# Patient Record
Sex: MALE | Race: OTHER | HISPANIC OR LATINO | URBAN - METROPOLITAN AREA
[De-identification: names, ages, dates, MRNs, and addresses within clinical notes are randomized per-mention and may not be internally consistent; named-entity substitution may affect disease eponyms.]

---

## 2022-10-04 ENCOUNTER — EMERGENCY (EMERGENCY)
Facility: HOSPITAL | Age: 52
LOS: 1 days | Discharge: HOME | End: 2022-10-04
Admitting: STUDENT IN AN ORGANIZED HEALTH CARE EDUCATION/TRAINING PROGRAM
Payer: SELF-PAY

## 2022-10-04 VITALS
DIASTOLIC BLOOD PRESSURE: 86 MMHG | HEART RATE: 77 BPM | OXYGEN SATURATION: 97 % | RESPIRATION RATE: 16 BRPM | TEMPERATURE: 98 F | SYSTOLIC BLOOD PRESSURE: 137 MMHG
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
## 2022-10-04 DIAGNOSIS — Z23 ENCOUNTER FOR IMMUNIZATION: ICD-10-CM

## 2022-10-04 DIAGNOSIS — S51.812A LACERATION WITHOUT FOREIGN BODY OF LEFT FOREARM, INITIAL ENCOUNTER: ICD-10-CM
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
## 2022-10-04 PROCEDURE — 90715 TDAP VACCINE 7 YRS/> IM: CPT

## 2022-10-04 PROCEDURE — 99283 EMERGENCY DEPT VISIT LOW MDM: CPT | Mod: 25

## 2022-10-04 PROCEDURE — 12001 RPR S/N/AX/GEN/TRNK 2.5CM/<: CPT

## 2022-10-04 PROCEDURE — 90471 IMMUNIZATION ADMIN: CPT

## 2022-10-04 RX ORDER — TETANUS TOXOID, REDUCED DIPHTHERIA TOXOID AND ACELLULAR PERTUSSIS VACCINE, ADSORBED 5; 2.5; 8; 8; 2.5 [IU]/.5ML; [IU]/.5ML; UG/.5ML; UG/.5ML; UG/.5ML
0.5 SUSPENSION INTRAMUSCULAR ONCE
Refills: 0 | Status: COMPLETED | OUTPATIENT
Start: 2022-10-04 | End: 2022-10-04

## 2022-10-04 RX ADMIN — TETANUS TOXOID, REDUCED DIPHTHERIA TOXOID AND ACELLULAR PERTUSSIS VACCINE, ADSORBED 0.5 MILLILITER(S): 5; 2.5; 8; 8; 2.5 SUSPENSION INTRAMUSCULAR at 12:15

## 2022-10-04 NOTE — ED ADULT NURSE NOTE - OBJECTIVE STATEMENT
Pt endorses cutting arm on , laceration noted to left forearm, bleeding controlled. medicated with tdap, PA at bedside suturing. No RN intervention needed.

## 2022-10-04 NOTE — ED ADULT TRIAGE NOTE - CHIEF COMPLAINT QUOTE
"I was cutting cardboard and I cut my arm." Lac present to left forearm, no bleeding at this time. Unknown last tetanus.

## 2022-10-04 NOTE — ED PROVIDER NOTE - PATIENT PORTAL LINK FT
You can access the FollowMyHealth Patient Portal offered by [facility] by registering at the following website: [email or web address]. By joining [employer]’s FollowMyHealth portal, you will also be able to view your health information using other applications (apps) compatible with our system.

## 2022-10-04 NOTE — ED ADULT NURSE NOTE - NSIMPLEMENTINTERV_GEN_ALL_ED
Implemented All Universal Safety Interventions:  [place] to call system. Call bell, personal items and telephone within reach. Instruct patient to call for assistance. Room bathroom lighting operational. Non-slip footwear when patient is off stretcher. Physically safe environment: no spills, clutter or unnecessary equipment. Stretcher in lowest position, wheels locked, appropriate side rails in place.

## 2022-10-04 NOTE — ED PROVIDER NOTE - SKIN, MLM
2 cm laceration to volar left forearm, +FROM wrist, radial pulse 2+, strength 5/5, sensation intact distally

## 2022-10-04 NOTE — ED PROVIDER NOTE - CLINICAL SUMMARY MEDICAL DECISION MAKING FREE TEXT BOX
52 y/o m presents c/o left forearm lac 2/2 .  Ext NVI, tetanus updated in ED, lac repaired.  Will d/c with wound care instructions, f/u in 10 days for suture removal

## 2022-10-04 NOTE — ED PROVIDER NOTE - OBJECTIVE STATEMENT
52 y/o m presents with lac to left forearm.  Pt was using a  and accidentally cut his forearm when he pulled the blade through too hard.  Pt unsure of last tetanus.  Denies numbness/tingling to ext, all other ROS negative.

## 2022-10-04 NOTE — ED PROVIDER NOTE - NSFOLLOWUPINSTRUCTIONS_ED_ALL_ED_FT
Please return to ED for suture removal in 10 days on 10/14/22    Cuidado de un desgarro, en adultos    Laceration Care, Adult      Un desgarro es un aston que puede atravesar todas las capas de la piel hasta el tejido que se encuentra debajo de la piel. Algunos desgarros cicatrizan por sí solos. Otros se deben cerrar con puntos (suturas), grapas, tiras adhesivas para la piel o goma para cerrar la piel.    El cuidado adecuado de un desgarro reduce el riesgo de infección, ayuda a que el desgarro cierre mejor, y puede prevenir la formación de cicatrices.      Consejos generales    •Mantenga la herida limpia y seca.      • No se rasque ni se toque la herida.      •Lávese las cesario con agua y jabón nguyen al menos 20 segundos antes y después de tocar la herida o cambiarse la venda (vendaje). Use desinfectante para cesario si no dispone de agua y jabón.      • No usedesinfectantes ni antisépticos, jarad alcohol para frotar, para limpiar la herida, a menos que se lo indique mckinney médico.      •Si le colocaron un vendaje, cámbielo al menos abhinav vez al día o jarad se lo haya indicado el médico. También debe cambiarlo si se moja o se ensucia.        Cómo cuidar del desgarro    Si se utilizaron suturas o grapas:     •Mantenga la herida completamente seca nguyen las primeras 24 horas o jarad se lo haya indicado el médico. Transcurrido barb tiempo, puede ducharse o rupali em de inmersión. No se sumerja en agua hasta que le hayan quitado las suturas o grapas.    •Limpie la herida abhinav vez por día o jarad se lo haya indicado el médico. Para hacer esto:  •Lave la herida con agua y jabón.      •Enjuáguela con agua para quitar todo el jabón.      •Séquela dando palmaditas con abhinav toalla limpia. No frote la herida.        •Después de limpiar la herida, aplique abhinav delgada capa de ungüento con antibiótico, otros ungüentos tópicos, o un vendaje no adherente jarad se lo haya indicado el médico. [place] ayudará a prevenir infecciones y a evitar que el vendaje se adhiera a la herida.      •Quentin que le retiren las suturas o las grapas jarad se lo haya indicado el médico. No retire las suturas ni las grapas usted mismo.      Si se utilizaron tiras adhesivas para la piel:     • No deje que las tiras adhesivas para la piel se mojen. Puede bañarse o ducharse, mandy tenga cuidado de mantener la herida seca.      •Si se moja, séquela dando palmaditas con abhinav toalla limpia. No frote la herida.      •Las tiras adhesivas para la piel se caen solas. Si los bordes de las tiras adhesivas empiezan a despegarse y enroscarse, puede recortar los que estén sueltos. No retire las tiras adhesivas por completo a menos que el médico se lo indique.      Si se utilizó goma para cerrar la piel:     •Puede bañarse o ducharse, mandy tenga cuidado de mantener la herida seca. No sumerja la herida en agua.      •Después de ducharse o darse un baño, seque el aston dando palmaditas con abhinav toalla limpia. No frote la herida.      • No practique actividades que lo vero transpirar mucho hasta que la goma para cerrar la piel se haya caído tomas.      • No aplique líquidos, cremas ni ungüentos medicinales en la herida mientras todavía tenga la goma para cerrar la piel. De lo contrario, puede aflojar la película antes de que la herida cicatrice.      •Si la herida está cubierta con un vendaje, no aplique cinta adhesiva directamente sobre la goma para cerrar la piel. De lo contrario, puede hacer que la goma se despegue antes de que la herida cicatrice.      • No toque la goma. La goma para cerrar la piel generalmente permanece sobre la piel de 5 a 10 días y luego se  tomas.        Siga estas instrucciones en mckinney casa:    Medicamentos     •Use los medicamentos de venta hans y los recetados solamente jarad se lo haya indicado el médico.      •Si le recetaron un medicamento o ungüento con antibiótico, tómelo o aplíqueselo jarad se lo haya indicado el médico. No deje de usarlo aunque la afección mejore.      Control del dolor y la hinchazón   •Si se lo indican, aplique hielo sobre la beverly de la lesión. Para hacer esto:  •Ponga el hielo en abhinav bolsa plástica.      •Coloque abhinav toalla entre la piel y la bolsa.      •Aplique el hielo nguyen 20 minutos, 2 o 3 veces por día.      •Retire el hielo si la piel se pone de color ashley brillante. [place] es muy importante. Si no puede sentir dolor, calor o frío, tiene un mayor riesgo de que se dañe la beverly.        •Nguyen las primeras 24 a 48 horas después de que le hayan reparado el desgarro, cuando esté sentado o acostado, levante (eleve) la beverly de la lesión por encima del nivel del corazón.        Instrucciones generales   Two wounds closed with skin glue. One is normal. The other is red with pus and infected.   •Evite cualquier actividad que pueda hacer que el desgarro vuelva a abrirse.    •Controle la herida todos los días para detectar signos de infección. Esté atento a lo siguiente:  •Aumento del enrojecimiento, la hinchazón o el dolor.      •Líquido o saima.      •Calor.      •Pus o mal olor.        •Concurra a todas las visitas de seguimiento. [place] es importante.        Comuníquese con un médico si:    •Le aplicaron la vacuna antitetánica y tiene hinchazón, dolor intenso, enrojecimiento o hemorragia en el sitio de la inyección.      •La herida cerrada se abre.    •Tiene cualquiera de estos signos de infección:  •Más enrojecimiento, hinchazón o dolor alrededor de la herida.      •Líquido o saima que salen de la herida.      •Calor que proviene de la herida.      •Pus o mal olor proveniente de la herida.      •Fiebre.        •Nota un cuerpo extraño en la herida, jarad un trozo de hickman o erica.      •El dolor no se juan alberto con los medicamentos.      •Observa que la piel cerca de la herida cambia de color.      •Necesita cambiar el vendaje con frecuencia.      •Presenta abhinav nueva erupción cutánea.      •Tiene entumecimiento alrededor de la herida.        Solicite ayuda de inmediato si:    •Tiene mucha hinchazón alrededor de la herida.      •El dolor aumenta repentinamente y es intenso.      •Presenta nódulos dolorosos cerca de la herida o en la piel en cualquier beverly del cuerpo.      •Tiene abhinav línea cuong que sale de la herida.      •La herida está en la mano o en el pie, y no puede  correctamente xavi de los dedos.      •La herida está en la mano o en el pie y observa que los dedos tienen un adonay pálido o azulado.        Resumen    •Un desgarro es un aston que puede atravesar todas las capas de la piel hasta el tejido que se encuentra debajo de la piel.      •Algunos desgarros cicatrizan por sí solos. Otros se deben cerrar con puntos (suturas), grapas, tiras adhesivas para la piel o goma para cerrar la piel.      •El cuidado adecuado de un desgarro reduce el riesgo de infección, ayuda a que el desgarro cierre mejor, y puede prevenir la formación de cicatrices.      Esta información no tiene jarad fin reemplazar el consejo del médico. Asegúrese de hacerle al médico cualquier pregunta que tenga.

## 2022-10-20 ENCOUNTER — EMERGENCY (EMERGENCY)
Facility: HOSPITAL | Age: 52
LOS: 1 days | Discharge: HOME | End: 2022-10-20
Admitting: EMERGENCY MEDICINE
Payer: SELF-PAY

## 2022-10-20 VITALS
TEMPERATURE: 98 F | DIASTOLIC BLOOD PRESSURE: 75 MMHG | HEART RATE: 90 BPM | OXYGEN SATURATION: 95 % | RESPIRATION RATE: 16 BRPM | SYSTOLIC BLOOD PRESSURE: 120 MMHG
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
## 2022-10-20 DIAGNOSIS — S51.812D LACERATION WITHOUT FOREIGN BODY OF LEFT FOREARM, SUBSEQUENT ENCOUNTER: ICD-10-CM

## 2022-10-20 PROCEDURE — L9995: CPT

## 2022-10-20 PROCEDURE — 99212 OFFICE O/P EST SF 10 MIN: CPT

## 2022-10-20 NOTE — ED PROVIDER NOTE - NSFOLLOWUPINSTRUCTIONS_ED_ALL_ED_FT
Suture Removal, Care After  The following information offers guidance on how to care for yourself after your procedure. Your health care provider may also give you more specific instructions. If you have problems or questions, contact your health care provider.  What can I expect after the procedure?  After your stitches (sutures) are removed, it is common to have:  •Some discomfort and swelling in the area.  •Slight redness in the area.  Follow these instructions at home:  If you have a dressing:    your hands with soap and water for at least 20 seconds before and after you change your bandage (dressing). If soap and water are not available, use hand .  •Change your dressing as told by your health care provider. If your dressing becomes wet or dirty, or develops a bad smell, change it as soon as possible.  •If your dressing sticks to your skin, pour warm, clean water over it until it loosens and can be removed without pulling apart the wound edges. Pat the area dry with a soft, clean towel. Do not rub the wound because that may cause bleeding.  Wound care   Two stitched wounds. One is normal. The other is red with pus and infected. •Check your wound every day for signs of infection. Check for:  •More redness, swelling, or pain.  •Fluid or blood.  •New warmth, a rash, or hardness at the wound site.  • Pus or a bad smell.  •Wash your hands with soap and water for at least 20 seconds before and after touching your wound. If soap and water are not available, use hand .  •Keep the wound area dry and clean. Clean and pat the wound dry as told by your health care provider.  •Apply cream or ointment only as told by your health care provider.  •If skin glue or adhesive strips were applied after sutures were removed, leave these closures in place. They may need to stay in place for 2 weeks or longer. If adhesive strip edges start to loosen and curl up, you may trim the loose edges. Do not remove adhesive strips completely unless your health care provider tells you to do that.  •Continue to protect the wound from injury.  • Do not pick at your wound. Picking can cause an infection.  Bathing   • Do not take baths, swim, or use a hot tub until your health care provider approves. Ask your health care provider if you may take showers.  •Follow these steps for showering:  •If you have a dressing, remove it before getting into the shower.  •In the shower, allow soapy water to get on the wound. Avoid scrubbing the wound.  •When you get out of the shower, dry the wound by patting it with a clean towel.  •Reapply a dressing over the wound, if needed.  Scar care   When your wound has completely healed, help decrease the size of your scar by:  •Wearing sunscreen over the scar or covering it with clothing when you are outside. New scars get sunburned easily, which can make scarring worse.  •Gently massaging the scarred area. This can decrease scar thickness.  General instructions  •Take over-the-counter and prescription medicines only as told by your health care provider.  •Keep all follow-up visits. This is important.  Contact a health care provider if:  •You have more redness, swelling, or pain around your wound.  •You have fluid or blood coming from your wound.  •You have new warmth, a rash, or hardness at the wound site.  •You have pus or a bad smell coming from your wound.  •Your wound opens up.  Get help right away if:  •You have a fever or chills.  •You have red streaks coming from your wound.  Summary  •After your sutures are removed, it is common to have some discomfort and swelling in the area.  •Wash your hands with soap and water before you change your bandage (dressing).  •Keep the wound area dry and clean. Do not take baths, swim, or use a hot tub until your health care provider approves.

## 2022-10-20 NOTE — ED PROVIDER NOTE - CLINICAL SUMMARY MEDICAL DECISION MAKING FREE TEXT BOX
pt returns for uncomplicated suture removal -- no signs of inf, sutures removed w/o dif, no further emergent care indicated, to f/u w/pmd

## 2022-10-20 NOTE — ED PROVIDER NOTE - OBJECTIVE STATEMENT
The pt is a 52 y/o M, who returns to ED for suture removal to L forearm. Denies pain, pus, bleeding, swelling.

## 2022-10-20 NOTE — ED PROVIDER NOTE - PATIENT PORTAL LINK FT
You can access the FollowMyHealth Patient Portal offered by [facility] by registering at the following website: [email or web address]. By joining [employer]’s FollowMyHealth portal, you will also be able to view your health information using other applications (apps) compatible with our system.